# Patient Record
Sex: MALE | Race: WHITE | NOT HISPANIC OR LATINO
[De-identification: names, ages, dates, MRNs, and addresses within clinical notes are randomized per-mention and may not be internally consistent; named-entity substitution may affect disease eponyms.]

---

## 2019-08-29 ENCOUNTER — TRANSCRIPTION ENCOUNTER (OUTPATIENT)
Age: 54
End: 2019-08-29

## 2020-06-30 ENCOUNTER — INPATIENT (INPATIENT)
Facility: HOSPITAL | Age: 55
LOS: 0 days | Discharge: HOME | DRG: 392 | End: 2020-07-01
Attending: HOSPITALIST | Admitting: HOSPITALIST
Payer: COMMERCIAL

## 2020-06-30 VITALS
HEIGHT: 67 IN | DIASTOLIC BLOOD PRESSURE: 103 MMHG | SYSTOLIC BLOOD PRESSURE: 155 MMHG | WEIGHT: 225.09 LBS | OXYGEN SATURATION: 95 % | TEMPERATURE: 98 F | RESPIRATION RATE: 20 BRPM | HEART RATE: 88 BPM

## 2020-06-30 DIAGNOSIS — R07.9 CHEST PAIN, UNSPECIFIED: ICD-10-CM

## 2020-06-30 LAB
ALBUMIN SERPL ELPH-MCNC: 4.3 G/DL — SIGNIFICANT CHANGE UP (ref 3.3–5.2)
ALP SERPL-CCNC: 92 U/L — SIGNIFICANT CHANGE UP (ref 40–120)
ALT FLD-CCNC: 51 U/L — HIGH
ANION GAP SERPL CALC-SCNC: 15 MMOL/L — SIGNIFICANT CHANGE UP (ref 5–17)
AST SERPL-CCNC: 35 U/L — SIGNIFICANT CHANGE UP
BASOPHILS # BLD AUTO: 0.02 K/UL — SIGNIFICANT CHANGE UP (ref 0–0.2)
BASOPHILS NFR BLD AUTO: 0.2 % — SIGNIFICANT CHANGE UP (ref 0–2)
BILIRUB SERPL-MCNC: 1 MG/DL — SIGNIFICANT CHANGE UP (ref 0.4–2)
BUN SERPL-MCNC: 29 MG/DL — HIGH (ref 8–20)
CALCIUM SERPL-MCNC: 10.1 MG/DL — SIGNIFICANT CHANGE UP (ref 8.6–10.2)
CHLORIDE SERPL-SCNC: 101 MMOL/L — SIGNIFICANT CHANGE UP (ref 98–107)
CO2 SERPL-SCNC: 22 MMOL/L — SIGNIFICANT CHANGE UP (ref 22–29)
CREAT SERPL-MCNC: 1.01 MG/DL — SIGNIFICANT CHANGE UP (ref 0.5–1.3)
D DIMER BLD IA.RAPID-MCNC: <150 NG/ML DDU — SIGNIFICANT CHANGE UP
EOSINOPHIL # BLD AUTO: 0.03 K/UL — SIGNIFICANT CHANGE UP (ref 0–0.5)
EOSINOPHIL NFR BLD AUTO: 0.3 % — SIGNIFICANT CHANGE UP (ref 0–6)
GLUCOSE SERPL-MCNC: 159 MG/DL — HIGH (ref 70–99)
HCT VFR BLD CALC: 44.5 % — SIGNIFICANT CHANGE UP (ref 39–50)
HGB BLD-MCNC: 15.2 G/DL — SIGNIFICANT CHANGE UP (ref 13–17)
IMM GRANULOCYTES NFR BLD AUTO: 0.4 % — SIGNIFICANT CHANGE UP (ref 0–1.5)
LIDOCAIN IGE QN: 38 U/L — SIGNIFICANT CHANGE UP (ref 22–51)
LYMPHOCYTES # BLD AUTO: 1.06 K/UL — SIGNIFICANT CHANGE UP (ref 1–3.3)
LYMPHOCYTES # BLD AUTO: 10.3 % — LOW (ref 13–44)
MAGNESIUM SERPL-MCNC: 2 MG/DL — SIGNIFICANT CHANGE UP (ref 1.6–2.6)
MCHC RBC-ENTMCNC: 29.3 PG — SIGNIFICANT CHANGE UP (ref 27–34)
MCHC RBC-ENTMCNC: 34.2 GM/DL — SIGNIFICANT CHANGE UP (ref 32–36)
MCV RBC AUTO: 85.7 FL — SIGNIFICANT CHANGE UP (ref 80–100)
MONOCYTES # BLD AUTO: 0.36 K/UL — SIGNIFICANT CHANGE UP (ref 0–0.9)
MONOCYTES NFR BLD AUTO: 3.5 % — SIGNIFICANT CHANGE UP (ref 2–14)
NEUTROPHILS # BLD AUTO: 8.79 K/UL — HIGH (ref 1.8–7.4)
NEUTROPHILS NFR BLD AUTO: 85.3 % — HIGH (ref 43–77)
PLATELET # BLD AUTO: 224 K/UL — SIGNIFICANT CHANGE UP (ref 150–400)
POTASSIUM SERPL-MCNC: 4.6 MMOL/L — SIGNIFICANT CHANGE UP (ref 3.5–5.3)
POTASSIUM SERPL-SCNC: 4.6 MMOL/L — SIGNIFICANT CHANGE UP (ref 3.5–5.3)
PROT SERPL-MCNC: 6.6 G/DL — SIGNIFICANT CHANGE UP (ref 6.6–8.7)
RBC # BLD: 5.19 M/UL — SIGNIFICANT CHANGE UP (ref 4.2–5.8)
RBC # FLD: 13.3 % — SIGNIFICANT CHANGE UP (ref 10.3–14.5)
SARS-COV-2 RNA SPEC QL NAA+PROBE: SIGNIFICANT CHANGE UP
SODIUM SERPL-SCNC: 138 MMOL/L — SIGNIFICANT CHANGE UP (ref 135–145)
TROPONIN T SERPL-MCNC: <0.01 NG/ML — SIGNIFICANT CHANGE UP (ref 0–0.06)
WBC # BLD: 10.3 K/UL — SIGNIFICANT CHANGE UP (ref 3.8–10.5)
WBC # FLD AUTO: 10.3 K/UL — SIGNIFICANT CHANGE UP (ref 3.8–10.5)

## 2020-06-30 PROCEDURE — 71045 X-RAY EXAM CHEST 1 VIEW: CPT | Mod: 26

## 2020-06-30 PROCEDURE — 71275 CT ANGIOGRAPHY CHEST: CPT | Mod: 26

## 2020-06-30 PROCEDURE — 74174 CTA ABD&PLVS W/CONTRAST: CPT | Mod: 26

## 2020-06-30 PROCEDURE — 93010 ELECTROCARDIOGRAM REPORT: CPT | Mod: 76

## 2020-06-30 PROCEDURE — 75571 CT HRT W/O DYE W/CA TEST: CPT | Mod: 26

## 2020-06-30 PROCEDURE — 99222 1ST HOSP IP/OBS MODERATE 55: CPT

## 2020-06-30 PROCEDURE — 99220: CPT

## 2020-06-30 RX ORDER — MORPHINE SULFATE 50 MG/1
2 CAPSULE, EXTENDED RELEASE ORAL
Refills: 0 | Status: DISCONTINUED | OUTPATIENT
Start: 2020-06-30 | End: 2020-07-01

## 2020-06-30 RX ORDER — FAMOTIDINE 10 MG/ML
20 INJECTION INTRAVENOUS ONCE
Refills: 0 | Status: COMPLETED | OUTPATIENT
Start: 2020-06-30 | End: 2020-06-30

## 2020-06-30 RX ORDER — LANOLIN ALCOHOL/MO/W.PET/CERES
3 CREAM (GRAM) TOPICAL AT BEDTIME
Refills: 0 | Status: DISCONTINUED | OUTPATIENT
Start: 2020-06-30 | End: 2020-07-01

## 2020-06-30 RX ORDER — ASPIRIN/CALCIUM CARB/MAGNESIUM 324 MG
324 TABLET ORAL ONCE
Refills: 0 | Status: COMPLETED | OUTPATIENT
Start: 2020-06-30 | End: 2020-06-30

## 2020-06-30 RX ORDER — LIDOCAINE 4 G/100G
10 CREAM TOPICAL ONCE
Refills: 0 | Status: COMPLETED | OUTPATIENT
Start: 2020-06-30 | End: 2020-06-30

## 2020-06-30 RX ORDER — ASPIRIN/CALCIUM CARB/MAGNESIUM 324 MG
81 TABLET ORAL DAILY
Refills: 0 | Status: DISCONTINUED | OUTPATIENT
Start: 2020-06-30 | End: 2020-07-01

## 2020-06-30 RX ORDER — ATORVASTATIN CALCIUM 80 MG/1
20 TABLET, FILM COATED ORAL AT BEDTIME
Refills: 0 | Status: DISCONTINUED | OUTPATIENT
Start: 2020-06-30 | End: 2020-07-01

## 2020-06-30 RX ORDER — AMLODIPINE BESYLATE 2.5 MG/1
10 TABLET ORAL DAILY
Refills: 0 | Status: DISCONTINUED | OUTPATIENT
Start: 2020-06-30 | End: 2020-07-01

## 2020-06-30 RX ORDER — ACETAMINOPHEN 500 MG
650 TABLET ORAL EVERY 6 HOURS
Refills: 0 | Status: DISCONTINUED | OUTPATIENT
Start: 2020-06-30 | End: 2020-07-01

## 2020-06-30 RX ORDER — LOSARTAN POTASSIUM 100 MG/1
100 TABLET, FILM COATED ORAL DAILY
Refills: 0 | Status: DISCONTINUED | OUTPATIENT
Start: 2020-06-30 | End: 2020-07-01

## 2020-06-30 RX ORDER — METOPROLOL TARTRATE 50 MG
100 TABLET ORAL ONCE
Refills: 0 | Status: COMPLETED | OUTPATIENT
Start: 2020-06-30 | End: 2020-06-30

## 2020-06-30 RX ORDER — LABETALOL HCL 100 MG
10 TABLET ORAL ONCE
Refills: 0 | Status: COMPLETED | OUTPATIENT
Start: 2020-06-30 | End: 2020-06-30

## 2020-06-30 RX ORDER — PANTOPRAZOLE SODIUM 20 MG/1
40 TABLET, DELAYED RELEASE ORAL
Refills: 0 | Status: DISCONTINUED | OUTPATIENT
Start: 2020-06-30 | End: 2020-07-01

## 2020-06-30 RX ORDER — HYDRALAZINE HCL 50 MG
10 TABLET ORAL ONCE
Refills: 0 | Status: DISCONTINUED | OUTPATIENT
Start: 2020-06-30 | End: 2020-06-30

## 2020-06-30 RX ORDER — SODIUM CHLORIDE 9 MG/ML
1000 INJECTION INTRAMUSCULAR; INTRAVENOUS; SUBCUTANEOUS ONCE
Refills: 0 | Status: COMPLETED | OUTPATIENT
Start: 2020-06-30 | End: 2020-06-30

## 2020-06-30 RX ADMIN — ATORVASTATIN CALCIUM 20 MILLIGRAM(S): 80 TABLET, FILM COATED ORAL at 20:55

## 2020-06-30 RX ADMIN — Medication 10 MILLIGRAM(S): at 09:05

## 2020-06-30 RX ADMIN — Medication 30 MILLILITER(S): at 09:05

## 2020-06-30 RX ADMIN — Medication 100 MILLIGRAM(S): at 12:09

## 2020-06-30 RX ADMIN — FAMOTIDINE 20 MILLIGRAM(S): 10 INJECTION INTRAVENOUS at 09:05

## 2020-06-30 RX ADMIN — MORPHINE SULFATE 2 MILLIGRAM(S): 50 CAPSULE, EXTENDED RELEASE ORAL at 16:39

## 2020-06-30 RX ADMIN — LIDOCAINE 10 MILLILITER(S): 4 CREAM TOPICAL at 09:05

## 2020-06-30 RX ADMIN — SODIUM CHLORIDE 1000 MILLILITER(S): 9 INJECTION INTRAMUSCULAR; INTRAVENOUS; SUBCUTANEOUS at 10:43

## 2020-06-30 RX ADMIN — AMLODIPINE BESYLATE 10 MILLIGRAM(S): 2.5 TABLET ORAL at 16:39

## 2020-06-30 RX ADMIN — Medication 30 MILLILITER(S): at 22:10

## 2020-06-30 RX ADMIN — Medication 3 MILLIGRAM(S): at 20:55

## 2020-06-30 RX ADMIN — Medication 324 MILLIGRAM(S): at 09:08

## 2020-06-30 NOTE — ED PROVIDER NOTE - ATTENDING CONTRIBUTION TO CARE
HPI: 55yo male with h/o HTN, hyperlipidemia presenting with gnawing, severe midsternal chest pain, nonradiating, a/w diaphoresis. No shortness of breath, no back pain, no nausea/vomiting. +H/o gastroesophageal reflux disease but states this doesn't feel like gastroesophageal reflux disease. No fevers/chills. Follows with [facility], saw them a few years ago and reportedly had negative stress test at that time.     PE:  Gen: NAD  Head: NCAT  HEENT: Oral mucosa moist, normal conjunctiva  CV: RRR no murmurs, normal perfusion, pulses 2+ throughout  Resp: CTA b/l, no wheezing, rales, rhonchi, no respiratory distress  GI: Abd Soft NTND  Neuro: No focal neuro deficits  MSK: FROM all 4 extremities, no deformity  Skin: No rash, no bruising  Psych: Normal affect    MDM: 55yo male with h/o HTN, hyperlipidemia presenting with gnawing severe midsternal chest pain- ekg, labs, cta c/a/p r/o dissection, likely obs serial trop and cards c/s. Jackie Mendez DO         I performed a history and physical exam of the patient and discussed their management with the resident. I reviewed the resident's note and agree with the documented findings and plan of care. My medical decision making and observations are found above.

## 2020-06-30 NOTE — H&P ADULT - NSHPPHYSICALEXAM_GEN_ALL_CORE
T(C): 36.6 (06-30-20 @ 12:29), Max: 36.6 (06-30-20 @ 08:05)  HR: 62 (06-30-20 @ 12:44) (62 - 88)  BP: 132/86 (06-30-20 @ 12:29) (132/86 - 158/91)  RR: 19 (06-30-20 @ 12:29) (19 - 20)  SpO2: 97% (06-30-20 @ 12:29) (95% - 97%)    PHYSICAL EXAM: evaluation precludes physical exam. Pertinent physical exam findings as per video conference with teleNA  at bedside :

## 2020-06-30 NOTE — ED PROVIDER NOTE - NS ED ROS FT
No
CONSTITUTIONAL: No fevers, no chills  Eyes: No vision changes  Cardiovascular: +chest pain  Respiratory: No SOB  Gastrointestinal: No n/v/c/d, no abd pain  Genitourinary: no dysuria, no hematuria  SKIN: no rashes.  MSK: no weakness, no myalgias, no arthralgias  NEURO: no headache, no weakness, no numbness  PSYCHIATRIC: no known mental health issues.

## 2020-06-30 NOTE — ED ADULT NURSE REASSESSMENT NOTE - NS ED NURSE REASSESS COMMENT FT1
HR now 62 on monitor, main CT called and informed.
MD Bowman (cardiologist) at pts bedside, plan of care explained to pt, pt verbalized understaffing.
assumed care of pt at this time. pt a+ox3, lying comfortably in bed. pt reports midsternal chest pain, states pain has subsided since arriving but still present. pt denies any SOB or diff breathing. pt pre-medicated with Lopressor for CTA. pt placed on monitor, sinus with PVC's @ 85. VSS, pt offers no complaints at this time. will continue to monitor.
Pt awake and alert, no respiratory distress, VSS afebrile. Pt on cardiac monitor, sinus rhythm 60s. No c/o chest pain or discomfort at this time. Safety maintained. Will continue to monitor.

## 2020-06-30 NOTE — ED ADULT NURSE NOTE - OBJECTIVE STATEMENT
Assumed pt care at 0823.  Pt a&o x 3.  Pt c/o knawing steady midsternal deep chest pain that started this morning at work and is now a 6/10.  Pt states this is the first time he has ever had this.  Pt has no PMH of hearty disease, denies SOB, belly pain, N/V, radiation.  Pt states that the pain is slightly relieved when sitting down.  Pt drove himself to ED and states he was diaphoretic on the way here.  He took BP and cholesterol meds this morning. NAD noted.  Pt on cardiac monitor in bed.  Safety precautions maintained. Assumed pt care at 0823.  Pt a&o x 3.  Pt c/o gnawing steady midsternal deep chest pain that started this morning at work and is now a 6/10.  Pt states this is the first time he has ever had this.  Pt has no PMH of hearty disease, denies SOB, belly pain, N/V, radiation.  Pt states that the pain is slightly relieved when sitting down.  Pt drove himself to ED and states he was diaphoretic on the way here.  He took BP and cholesterol meds this morning. NAD noted.  Pt on cardiac monitor in bed.  Safety precautions maintained.

## 2020-06-30 NOTE — H&P ADULT - HISTORY OF PRESENT ILLNESS
54 year old male w gout, HLD, HTN who presented to ED c/o chest pain      Pt states that he was walking around at work this morning and began to have 8/10 SSCP w/o radiation.  He describes the pain as gnawing in nature, constant, nothing has makes it worse, but he states that it has lessened to 6/10 currently.  He has not had this type of pain before.  He drove himself to the ED and states that he began to feel sweaty on the drive over, but that has resolved.  He recently returned for vacation.  Endorses EtOH, denies smoking, illicit drug use.  Denies fever, shortness of breath, abdominal pain, back pain, numbness/tingling, nausea, vomiting, constipation, diarrhea.    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  He describes the pain as gnawing in nature, constant, nothing has makes it worse, but he states that it has lessened to 6/10 currently.  He has not had this type of pain before.  He drove himself to the ED and states that he began to feel sweaty on the drive over, but that has resolved.  He recently returned for vacation.  Endorses EtOH, denies smoking, illicit drug use.  Denies fever, shortness of breath, abdominal pain, back pain, numbness/tingling, nausea, vomiting, constipation, diarrhea.    Last had stress test 2-3 years ago  Denied recent change in exercise tolerance until this AM    In ED /103  HR 88  RR 20  T 97.9  95% sat RA  In ED received  mg  IV labetalol 10 mg , IV hydralazine 10 mg  CT w IV contrast chest ,abd and pelvis ruled out aortic dissection and PE  prior to cardiac CT po lopressor 100 mg x 1 po  cardiology was consulted        PAST MEDICAL HX  Gout  HLD hyperlipidemia  HTN hypertension      PAST SURGICAL HX  Colonoscopy 2018  Knee surgery Left:  ACL + MCL repair    FAMILY HX  +breast CA: mother  +HTN: mother  denied CAD      SOCIAL HX  nonsmoker  social alcohol  works as a  making deliveries

## 2020-06-30 NOTE — ED PROVIDER NOTE - PROGRESS NOTE DETAILS
Given the significant and immediate threats to this patient based on initial presentation, the benefits of emergency contrast-enhanced CT imaging without obtaining GFR/creatinine serum level results greatly outweigh the potential risk of harm due to contrast-induced nephropathy. Chriss: Discussed with radiology attending wrt contrast for CCTA after contrast for CTA Chest/Abd/Pelv, no contraindication to proceed with CCTA.  Pt given IVF bolus, and no hx of renal disease.

## 2020-06-30 NOTE — ED CDU PROVIDER INITIAL DAY NOTE - OBJECTIVE STATEMENT
54M h/o HTn, HLD p/w CP while walking this morning. Constant, no aggravating factors. Pain improving by the time he arrived in ED. Denies fever, SOB, abd pain, nausea, numbness, tingling.

## 2020-06-30 NOTE — CONSULT NOTE ADULT - ASSESSMENT
54y Male with prior history of HTN, HLD, obesity who presents with chest pain.     HTN  - BP elevated received labetolol    Dyslipidemia  - Continue statin    Chest pain  - d/w ED regarding CTCA to r/o CAD  - Serial cardiac enzymes for now    Plan discussed with ER team

## 2020-06-30 NOTE — ED CDU PROVIDER DISPOSITION NOTE - ATTENDING CONTRIBUTION TO CARE
Heidi: I performed a face to face bedside interview with patient regarding history of present illness, review of symptoms and past medical history. I completed an independent physical exam.  I have discussed patient's plan of care with advanced care provider.   I agree with note as stated above including HISTORY OF PRESENT ILLNESS, HIV, PAST MEDICAL/SURGICAL/FAMILY/SOCIAL HISTORY, ALLERGIES AND HOME MEDICATIONS, REVIEW OF SYSTEMS, PHYSICAL EXAM, MEDICAL DECISION MAKING and any PROGRESS NOTES during the time I functioned as the attending physician for this patient  unless otherwise noted. My brief assessment is as follows: Patient placed in CDU for cardiac cta, unable to safely control HR for CTA. Discussed with cardiology, will admit for cath.

## 2020-06-30 NOTE — CONSULT NOTE ADULT - SUBJECTIVE AND OBJECTIVE BOX
[facility], THE [facility]                              [address] [address]                                                 PHONE: [phone]                                                 FAX: [phone]  ------------------------------------------------------------------------------------------------    54y Male with past medical history as under presents c/o chest pain.  Pt states that he was walking around at work this morning and began to have 8/10 SSCP w/o radiation.  He describes the pain as gnawing in nature, constant, nothing has makes it worse, but he states that it has lessened to 6/10 currently.  He has not had this type of pain before.  He drove himself to the ED and states that he began to feel sweaty on the drive over, but that has resolved.   He reports he had been in the [facility] on vacation and has not been watching his diet and salt intake. He denies any prior cardiac history and reportedly had stress test few years ago.   At the time of evaluation, pt reports 3/10 chest pain. He has h/o HTN and HLD. He in on Exforge and Crestor.   He was noted to be markedly hypertensive and received iv labetalol with control of his BP.    PAST MEDICAL & SURGICAL HISTORY:      No Known Allergies      Review of Systems:  Positive for chest pain  Rest of the systems were reviewed and was negative.     Family history:   No pertinent family history in first degree relative of early CAD, sudden cardiac death or  congenital heart disease    Social History:  No smoking   No alcohol  No other drug use    Vital Signs Last 24 Hrs  T(C): 36.6 (30 Jun 2020 08:05), Max: 36.6 (30 Jun 2020 08:05)  T(F): 97.9 (30 Jun 2020 08:05), Max: 97.9 (30 Jun 2020 08:05)  HR: 80 (30 Jun 2020 10:09) (80 - 88)  BP: 148/70 (30 Jun 2020 10:09) (148/70 - 158/91)  BP(mean): --  RR: 20 (30 Jun 2020 10:09) (20 - 20)  SpO2: 97% (30 Jun 2020 10:09) (95% - 97%)    PHYSICAL EXAM:  Constitutional: Obese male laying in bed; alert and co-operative  HEENT:     Head: Normocephalic and atraumatic  Eyes: Conjunctiva normal, No scleral icterus  Neck: Supple, No JVD  Mouth/Throat: Mucous membranes are normal. Mucosa are not pale or dry.  Cardiovascular: regular S1, S2  Respiratory: Lungs clear to auscultation; no crepitations, no wheeze  Gastrointestinal:  Soft, Non-tender, + BS	  Musculoskeletal: Normal range of motion. No edema  Skin: Warm and dry. No cyanosis . No diaphoresis.  Neurologic: Alert oriented to time place and person. Normal strength and no tremor.  Psychiatric: Normal mood and affect, Speech is normal and behavior is normal.        LABS:                        15.2   10.30 )-----------( 224      ( 30 Jun 2020 09:08 )             44.5     06-30    138  |  101  |  29.0<H>  ----------------------------<  159<H>  4.6   |  22.0  |  1.01    Ca    10.1      30 Jun 2020 09:08  Mg     2.0     06-30    TPro  6.6  /  Alb  4.3  /  TBili  1.0  /  DBili  x   /  AST  35  /  ALT  51<H>  /  AlkPhos  92  06-30    CARDIAC MARKERS ( 30 Jun 2020 09:08 )  x     / <0.01 ng/mL / x     / x     / x        RADIOLOGY & ADDITIONAL STUDIES: (reviewed)  CXR was independently reviewed and demonstrated: clear lungs    CARDIOLOGY TESTING:(reviewed)     ECG (Independent visualization): sinus bradycardia with no ST changes    MEDICATIONS:(reviewed)  Home Medications:  Home Medications:      MEDICATIONS  (STANDING):  sodium chloride 0.9% Bolus 1000 milliLiter(s) IV Bolus once    MEDICATIONS  (PRN):

## 2020-06-30 NOTE — ED CDU PROVIDER DISPOSITION NOTE - CLINICAL COURSE
54M h/o HTn, HLD p/w CP while walking this morning. Constant, no aggravating factors. Pain improving by the time he arrived in ED.  Troponin negative x 2, was unable to get CTA cardiac as HR was above 60 consistently.  He is still having CP 4/10, I s/w Dr Stout he had Dr Fuentes evaluate patient and he recommends admission for cardiac cath in am.

## 2020-06-30 NOTE — ED PROVIDER NOTE - OBJECTIVE STATEMENT
Pt is a 55 y/o M w/PMHx HTN, HLD presents c/o chest pain.  Pt states that he was walking around at work this morning and began to have 8/10 SSCP w/o radiation.  He describes the pain as gnawing in nature, constant, nothing has makes it worse, but he states that it has lessened to 6/10 currently.  He has not had this type of pain before.  He drove himself to the ED and states that he began to feel sweaty on the drive over, but that has resolved.  He recently returned for vacation.  Endorses EtOH, denies smoking, illicit drug use.  Denies fever, shortness of breath, abdominal pain, back pain, numbness/tingling, nausea, vomiting, constipation, diarrhea.

## 2020-06-30 NOTE — H&P ADULT - ASSESSMENT
54 year old male w gout, HLD, HTN who presented to ED c/o chest pain      #Acute chest pain/ ACS  CT scan rules out dissection and PE  unable to get Calcium score since elevated HR  1. admit to telemetry  2. serial trop  3.  mg in ED  4. asa 81 mg daily  5. NPO after midnight for cardiac cath      #Gout  1. on uloric; not available  2. if cardiac disease established on cath then discuss if this med should be continued      #HLD  1. check lipids in AM  2. crestor interchange to atorvastatn 20 mg      #HTN  on exforge   1. amlodipine 10 mg  2. valsartan 320 interchanged for losartan 100 mg      #Obesity/ increased BMI  1. Hb A1c  2. lipids as above      IMPROVE VTE Individual Risk Assessment    RISK                                                                Points    [  ] Previous VTE                                                  3    [  ] Thrombophilia                                               2    [  ] Lower limb paralysis                                      2        (unable to hold up >15 seconds)      [  ] Current Cancer                                              2         (within 6 months)    [  ] Immobilization > 24 hrs                                1    [  ] ICU/CCU stay > 24 hours                              1    [  ] Age > 60                                                      1    IMPROVE VTE Score ______0___    IMPROVE Score 0-1: Low Risk, No VTE prophylaxis required for most patients, encourage ambulation.   IMPROVE Score 2-3: At risk, pharmacologic VTE prophylaxis is indicated for most patients (in the absence of a contraindication)  IMPROVE Score > or = 4: High Risk, pharmacologic VTE prophylaxis is indicated for most patients (in the absence of a contraindication)

## 2020-06-30 NOTE — ED ADULT TRIAGE NOTE - CHIEF COMPLAINT QUOTE
Pt c/o midsternal chest pain x 1 hour, hx of HTN, pt states he became diaphoretic when pain started, denies n/v, denies radiation of pain, took BP meds this morning

## 2020-06-30 NOTE — PROGRESS NOTE ADULT - SUBJECTIVE AND OBJECTIVE BOX
[facility], [facility]                                   [address] [address]                                                      PHONE: [phone]                                                         FAX: [phone]  -----------------------------------------------------------------------------------------------------------    Troponin negative x 2, was unable to get CTA cardiac as HR was above 60 consistently.  He is still having CP 4/10. Planned for cardiac cath    Received call from pt's wife. Explained his clinical presentation and management plan including recommendations for coronary CTA and cath    She declines for pt to proceed with cardiac cath and would like the pt to be evaluated with Dr. Martin that she works with. She will have an appointment with him tomorrow    Will cancel cath for now    Pt can be discharged from cardiac standpoint and FU with Dr. Martin in AM [facility], [facility]                                   [address] [address]                                                      PHONE: [phone]                                                         FAX: [phone]  -----------------------------------------------------------------------------------------------------------    Troponin negative x 2, was unable to get CTA cardiac as HR was above 60 consistently.  He is still having CP 4/10. Planned for cardiac cath    Received call from pt's wife. Explained his clinical presentation and management plan including recommendations for coronary CTA and cath    She declines for pt to proceed with cardiac cath and would like the pt to be evaluated with Dr. Martin that she works with. She will have an appointment with him tomorrow    Will cancel cath for now    Pt can be discharged from cardiac standpoint and FU with Dr. Martin in AM        Addendum: 6/30/2020 4 pm    s/w wife . Pt and wife are agreeable to cath    will schedule in AM

## 2020-07-01 ENCOUNTER — TRANSCRIPTION ENCOUNTER (OUTPATIENT)
Age: 55
End: 2020-07-01

## 2020-07-01 VITALS
TEMPERATURE: 98 F | OXYGEN SATURATION: 97 % | HEART RATE: 73 BPM | SYSTOLIC BLOOD PRESSURE: 127 MMHG | DIASTOLIC BLOOD PRESSURE: 88 MMHG | RESPIRATION RATE: 18 BRPM

## 2020-07-01 LAB
A1C WITH ESTIMATED AVERAGE GLUCOSE RESULT: 6.1 % — HIGH (ref 4–5.6)
CHOLEST SERPL-MCNC: 238 MG/DL — HIGH (ref 110–199)
ESTIMATED AVERAGE GLUCOSE: 128 MG/DL — HIGH (ref 68–114)
HCV AB S/CO SERPL IA: 0.07 S/CO — SIGNIFICANT CHANGE UP (ref 0–0.99)
HCV AB SERPL-IMP: SIGNIFICANT CHANGE UP
HDLC SERPL-MCNC: 68 MG/DL — SIGNIFICANT CHANGE UP
LIPID PNL WITH DIRECT LDL SERPL: 109 MG/DL — SIGNIFICANT CHANGE UP
SARS-COV-2 IGG SERPL QL IA: POSITIVE
SARS-COV-2 IGM SERPL IA-ACNC: 18.7 INDEX — HIGH
TOTAL CHOLESTEROL/HDL RATIO MEASUREMENT: 4 RATIO — SIGNIFICANT CHANGE UP (ref 3.4–9.6)
TRIGL SERPL-MCNC: 306 MG/DL — HIGH (ref 10–200)

## 2020-07-01 PROCEDURE — 93005 ELECTROCARDIOGRAM TRACING: CPT

## 2020-07-01 PROCEDURE — 36415 COLL VENOUS BLD VENIPUNCTURE: CPT

## 2020-07-01 PROCEDURE — 71045 X-RAY EXAM CHEST 1 VIEW: CPT

## 2020-07-01 PROCEDURE — C1887: CPT

## 2020-07-01 PROCEDURE — 93458 L HRT ARTERY/VENTRICLE ANGIO: CPT

## 2020-07-01 PROCEDURE — 80053 COMPREHEN METABOLIC PANEL: CPT

## 2020-07-01 PROCEDURE — 99153 MOD SED SAME PHYS/QHP EA: CPT

## 2020-07-01 PROCEDURE — 86803 HEPATITIS C AB TEST: CPT

## 2020-07-01 PROCEDURE — 83690 ASSAY OF LIPASE: CPT

## 2020-07-01 PROCEDURE — G0378: CPT

## 2020-07-01 PROCEDURE — 96375 TX/PRO/DX INJ NEW DRUG ADDON: CPT | Mod: XU

## 2020-07-01 PROCEDURE — 93571 IV DOP VEL&/PRESS C FLO 1ST: CPT | Mod: LD

## 2020-07-01 PROCEDURE — 80061 LIPID PANEL: CPT

## 2020-07-01 PROCEDURE — 75571 CT HRT W/O DYE W/CA TEST: CPT

## 2020-07-01 PROCEDURE — 99238 HOSP IP/OBS DSCHRG MGMT 30/<: CPT

## 2020-07-01 PROCEDURE — 87635 SARS-COV-2 COVID-19 AMP PRB: CPT

## 2020-07-01 PROCEDURE — 74174 CTA ABD&PLVS W/CONTRAST: CPT

## 2020-07-01 PROCEDURE — 84484 ASSAY OF TROPONIN QUANT: CPT

## 2020-07-01 PROCEDURE — C1894: CPT

## 2020-07-01 PROCEDURE — 99285 EMERGENCY DEPT VISIT HI MDM: CPT | Mod: 25

## 2020-07-01 PROCEDURE — 83036 HEMOGLOBIN GLYCOSYLATED A1C: CPT

## 2020-07-01 PROCEDURE — 83735 ASSAY OF MAGNESIUM: CPT

## 2020-07-01 PROCEDURE — 96374 THER/PROPH/DIAG INJ IV PUSH: CPT | Mod: XU

## 2020-07-01 PROCEDURE — 85027 COMPLETE CBC AUTOMATED: CPT

## 2020-07-01 PROCEDURE — 99152 MOD SED SAME PHYS/QHP 5/>YRS: CPT

## 2020-07-01 PROCEDURE — 85379 FIBRIN DEGRADATION QUANT: CPT

## 2020-07-01 PROCEDURE — C1769: CPT

## 2020-07-01 PROCEDURE — C1760: CPT

## 2020-07-01 PROCEDURE — 86769 SARS-COV-2 COVID-19 ANTIBODY: CPT

## 2020-07-01 PROCEDURE — 71275 CT ANGIOGRAPHY CHEST: CPT

## 2020-07-01 RX ORDER — ESOMEPRAZOLE MAGNESIUM 40 MG/1
0 CAPSULE, DELAYED RELEASE ORAL
Qty: 0 | Refills: 0 | DISCHARGE

## 2020-07-01 RX ORDER — FEBUXOSTAT 40 MG/1
0 TABLET ORAL
Qty: 0 | Refills: 0 | DISCHARGE

## 2020-07-01 RX ORDER — PANTOPRAZOLE SODIUM 20 MG/1
1 TABLET, DELAYED RELEASE ORAL
Qty: 60 | Refills: 0
Start: 2020-07-01

## 2020-07-01 RX ORDER — AMLODIPINE AND VALSARTAN 5; 320 MG/1; MG/1
1 TABLET, FILM COATED ORAL
Qty: 0 | Refills: 0 | DISCHARGE

## 2020-07-01 RX ORDER — ASPIRIN/CALCIUM CARB/MAGNESIUM 324 MG
1 TABLET ORAL
Qty: 0 | Refills: 0 | DISCHARGE
Start: 2020-07-01

## 2020-07-01 RX ORDER — ROSUVASTATIN CALCIUM 5 MG/1
1 TABLET ORAL
Qty: 0 | Refills: 0 | DISCHARGE

## 2020-07-01 RX ORDER — ROSUVASTATIN CALCIUM 5 MG/1
1 TABLET ORAL
Qty: 30 | Refills: 0
Start: 2020-07-01 | End: 2020-07-30

## 2020-07-01 RX ORDER — FEBUXOSTAT 40 MG/1
1 TABLET ORAL
Qty: 0 | Refills: 0 | DISCHARGE

## 2020-07-01 RX ADMIN — Medication 81 MILLIGRAM(S): at 07:47

## 2020-07-01 RX ADMIN — MORPHINE SULFATE 2 MILLIGRAM(S): 50 CAPSULE, EXTENDED RELEASE ORAL at 07:47

## 2020-07-01 RX ADMIN — AMLODIPINE BESYLATE 10 MILLIGRAM(S): 2.5 TABLET ORAL at 05:39

## 2020-07-01 RX ADMIN — LOSARTAN POTASSIUM 100 MILLIGRAM(S): 100 TABLET, FILM COATED ORAL at 05:39

## 2020-07-01 RX ADMIN — PANTOPRAZOLE SODIUM 40 MILLIGRAM(S): 20 TABLET, DELAYED RELEASE ORAL at 05:39

## 2020-07-01 NOTE — DISCHARGE NOTE PROVIDER - CARE PROVIDER_API CALL
Perlman, Theodore M  GASTROENTEROLOGY  [address] [place] [address]  Phone: [phone]  Fax: [phone]  Follow Up Time:

## 2020-07-01 NOTE — PROGRESS NOTE ADULT - SUBJECTIVE AND OBJECTIVE BOX
Cardiology NP                                                                              PROGRESS NOTE      Post Cath   Normal Cors no intervention.     	  Vitals:  T(C): 36.7 (07-01-20 @ 08:40), Max: 36.7 (06-30-20 @ 20:38)  HR: 80 (07-01-20 @ 08:40) (62 - 85)  BP: 119/78 (07-01-20 @ 08:40) (119/78 - 158/97)  RR: 16 (07-01-20 @ 08:40) (16 - 19)  SpO2: 98% (07-01-20 @ 08:40) (97% - 98%)  Wt(kg): --  I&O's Summary          PHYSICAL EXAM:  Appearance: Comfortable. No acute distress  HEENT:  Head and neck: Atraumatic. Normocephalic.  Normal oral mucosa, PERRL, Neck is supple. No JVD, No carotid bruit.   Neurologic: A & O x 3, no focal deficits. EOMI.  Lymphatic: No cervical lymphadenopathy  Cardiovascular: Normal S1 S2, No murmur, rubs/gallops. No JVD, No edema  Respiratory: Lungs clear to auscultation  Gastrointestinal:  Soft, Non-tender, + BS  Lower Extremities: No edema  Psychiatry: Patient is calm. No agitation. Mood & affect appropriate  Skin: No rashes/ ecchymoses/cyanosis/ulcers visualized on the face, hands or feet.      CURRENT MEDICATIONS:  amLODIPine   Tablet 10 milliGRAM(s) Oral daily  losartan 100 milliGRAM(s) Oral daily    melatonin  pantoprazole    Tablet  atorvastatin  aspirin enteric coated          LABS:	 	  CARDIAC MARKERS ( 30 Jun 2020 20:37 )  x     / <0.01 ng/mL / x     / x     / x      p-BNP 30 Jun 2020 20:37: x    , CARDIAC MARKERS ( 30 Jun 2020 12:20 )  x     / <0.01 ng/mL / x     / x     / x      p-BNP 30 Jun 2020 12:20: x    , CARDIAC MARKERS ( 30 Jun 2020 09:08 )  x     / <0.01 ng/mL / x     / x     / x      p-BNP 30 Jun 2020 09:08: x                              15.2   10.30 )-----------( 224      ( 30 Jun 2020 09:08 )             44.5     06-30    138  |  101  |  29.0<H>  ----------------------------<  159<H>  4.6   |  22.0  |  1.01    Ca    10.1      30 Jun 2020 09:08  Mg     2.0     06-30    TPro  6.6  /  Alb  4.3  /  TBili  1.0  /  DBili  x   /  AST  35  /  ALT  51<H>  /  AlkPhos  92  06-30    proBNP:   Lipid Profile: Date: 07-01 @ 06:18  Total cholesterol 238; Direct LDL: 109; HDL: 68; Triglycerides:306    Plan/ Assessment  59 yo male post cath normal cta and angiogram nonobstructive    Post cath normal Cors cholesterol is elevated would increase Crestor to 10mg po daily would consider Pulmonary consult as out patient for sleep apnea.   Recommend diet modification as well as increase activity.   Continue tight control of BP and reduce salt intake.   BTB   bedrest for 1 hour and if stable ok to raise HOB >30 ' then if groin stable ok OOB

## 2020-07-01 NOTE — PROGRESS NOTE ADULT - SUBJECTIVE AND OBJECTIVE BOX
Pt seen prior to cath   labs reviewed   pain free   Mallampati 2   ASA 2   GFR 84  Creat 1.01  BRA % 0.7     awaiting cath with Dr Joseph.

## 2020-07-01 NOTE — DISCHARGE NOTE PROVIDER - HOSPITAL COURSE
54 male nonsmoker PMH HTN, Dyslipidemia, Gout recently binged (EtOH) on vacation for past 3-4 days presenting with sharp lower central chest pain lasting 2 hours currently 4/10, non radiating without any relieving or aggravating factors.     Negative workup including Troponin, EKG, CTA. Admitted for cardiac catheterization as per Cardiology recommendations.     Cardiac catheterization showed large RCA w/ mild disease, Large LAD w/ moderate proximal 50%-60% stenosis w/ large distal vessel/IFR    NEGATIVE. , A1c 6.1. His Crestor is being increased to 10mg.        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 Started in twice daily PPI. Advised to follow up with primary GI.        Medically stable for discharge home

## 2020-07-01 NOTE — DISCHARGE NOTE NURSING/CASE MANAGEMENT/SOCIAL WORK - PATIENT PORTAL LINK FT
You can access the FollowMyHealth Patient Portal offered by [facility] by registering at the following website: [email or web address]. By joining [facility]’s FollowMyHealth portal, you will also be able to view your health information using other applications (apps) compatible with our system.

## 2020-07-01 NOTE — DISCHARGE NOTE PROVIDER - NSDCMRMEDTOKEN_GEN_ALL_CORE_FT
aspirin 81 mg oral delayed release tablet: 1 tab(s) orally once a day  Exforge 10 mg-320 mg oral tablet: 1 tab(s) orally once a day  pantoprazole 40 mg oral delayed release tablet: 1 tab(s) orally 2 times a day  rosuvastatin 10 mg oral tablet: 1 tab(s) orally once a day  Uloric 40 mg oral tablet: 1 tab(s) orally once a day

## 2020-08-19 ENCOUNTER — TRANSCRIPTION ENCOUNTER (OUTPATIENT)
Age: 55
End: 2020-08-19

## 2021-03-19 PROBLEM — I10 ESSENTIAL (PRIMARY) HYPERTENSION: Chronic | Status: ACTIVE | Noted: 2020-06-30

## 2021-03-19 PROBLEM — E78.5 HYPERLIPIDEMIA, UNSPECIFIED: Chronic | Status: ACTIVE | Noted: 2020-06-30

## 2021-04-28 NOTE — ED ADULT TRIAGE NOTE - TEMPERATURE IN CELSIUS (DEGREES C)
Your Child's Health  7-8 Year-Old Visit      Adeola Shaw  April 28, 2021    Visit Vitals  BP 92/60   Pulse 76   Temp 98.2 °F (36.8 °C) (Temporal)   Ht 3' 10.06\" (1.17 m)   Wt 19.8 kg   SpO2 99%   BMI 14.46 kg/m²     Weight: 43.65 lbs      YOUR CHILD'S 7 and 8 YEAR-OLD VISITS      School / Development / Behavior   Children should be well-adjusted in their school setting this at age. Success in school depends on several skills--communication skills, cooperation, attention, cognitive ability. Problems in one area may affect a child’s overall school experience. It is very important to stay in touch with teachers in order to identify and address any problems as soon as they are recognized. To help your child learn well, be sure they are well rested and have a healthy breakfast every morning.    Children need to be in school if they are going to learn and advance. Missing school frequently will lead to a lot of problems for a child; this needs to be addressed if it is happening. If your child receives any extra services through an IEP, make sure the IEP is reviewed regularly and updated if needed.     With increasing age comes increasing opportunities for activities outside of school (sports, arts, scouting).  Being part of a peer group becomes more important to children as they are growing older. They may encounter friends with different values and beliefs. Discuss differences openly with your children to help them start to understand the diversity of our society. Always listen without interrupting. Your children may still need reminding of the rules and expectations which you have for them, but they are developing more of a conscience and awareness of right and wrong which will affect how they view rules and social expectations. Discuss what consequences are for not following family rules--make sure they are reasonable and be consistent in enforcing them.     Children should have some definite  responsibilities at this age. Simple household tasks like making their bed, setting the table, helping with meals or simple household chores should be an expectation. Tell your child that you notice and appreciate what they are doing so that they become more confident and ready to take on more responsibility as time goes by. Children are motivated to do well when their parents provide a lot of encouragement. Make sure to find time every day for just talking with your children (no TV, no phone, no music!). Be a good role model for them by always acting responsibly, keep promises, and being on time.     Ask your child if they feel safe at school. Bullying is common--it is difficult to know exactly how common it is, but most children probably experience some bullying during their school years. Bullying hurts everyone--the child who is bullied, children who witness it, and the person doing the bullying (those children are likely to develop long term behavior and self-esteem issues). Children should know to report to you and/or teachers if they are being teased or bullied or if they witness another child being bullied. Bullying in schools (or anywhere) should not be tolerated. Talk to teachers, administrators or guidance counselors at the school to help with this issue. Good online resources regarding bullying are [employer].gov and the American Academy of Pediatrics \"HealthyChildren.org\" website (search for \"Bullying\"). These websites include guidelines that may be useful to both parents and children.      Health and Safety in (and out of!) the Home  Smoking: Continue to protect your child from cigarette smoke; secondhand smoke increases their risk of heart and lung disease. Vapors from e-cigarettes may also be harmful, so do not use those in your home or around children. If you smoke and are ready to consider quitting, talk to your doctor. Nicotine replacement products can be very helpful in breaking this tough  addiction. 1-800-QUIT-NOW is a national help line that can help you find resources; other resources can be found at cdc.gov.    Safety on the Internet: Just as you would not allow your child to go anywhere they want outside, they should not be allowed to “wander” the internet on their own. Keep the computer where you can observe your child’s use. Make sure you know how to check the internet history and do it regularly. If possible, set up a safety filter which will prevent your child from accessing inappropriate sites.      Dental Health: Your child should be brushing at least twice daily for 2 minutes at a time with a pea-sized amount of regular (fluoridated) toothpaste. Make flossing a regular part of their dental routine at this age. Most children need a parent’s help to make sure all of their back teeth are brushed well until they are ~8 years old. Hopefully they have seen a dentist by this age; look for a one now if you do not already have one. Let our office know if you use water from a private well; testing for fluoride content is recommended to determine if fluoride supplements are needed. Limiting candy, other sweets, juice and sticky/chewy foods remains important for their dental (and overall!) health.    Healthy Eating: Eat meals together as a family and talk during meals. (Leave the television off and do not allow phones or other electronics at the table.) For in between meals, keep nutritious choices around for snack times (fresh fruits and vegetables, string cheese, whole-grain crackers, yogurt, hard-boiled eggs, nuts).School-age children need 3 servings of good sources of calcium daily; this can include lowfat (or skim) milk, yogurt, low fat cheese or foods which have been fortified with calcium.  They should also get 600 IU of vitamin D daily which (along with appropriate calcium intake) ensures good bone health. Most people cannot meet their vitamin D needs with their usual diet, so a multivitamin  with iron supplement is a good way to get it. (A pure vitamin D supplement with 400 or 600 IU is also okay.)  Protect your child from the problems of overweight and obesity by teaching them that healthy choices are important, as are continuing to avoid unhealthy choices like greasy fast food, bagged snacks, sodas, sweetened drinks, juice, candy and sweets. Don’t keep these types of food in your home because your child will find them! If you give your child juice, give them no more than 6 to 8 ounces of 100% fruit juice daily. (Remember that eating fruit is a lot healthier than drinking it!) Also, don't allow snacking in front of the TV set--that is an unhealthy habit that is easier to prevent than change!    Healthy Activity: Set a goal of 60 minutes of physical activity every day--it can be all at once or broken up into shorter segments. Try to choose family activities as much as possible.  Do not overschedule your children. They may be tempted by lots of activities when their friends are participating in them, but they still need plenty of time for schoolwork, family time and some simple unstructured downtime.  Time sitting watching television, playing on the computer or using any form of electronic media is NOT physical activity. Set a time limit for media use each day (and enforce it!).  For suggestions on developing healthy media habits, go to the American Academy of Pediatrics \"HealthyChildren.org\" website and search for \"media use plan\".     Healthy Sleep: Children this age need 10 to 11 hours of sleep each night. Have a regular bedtime routine that does not involve electronic media (including TV) because screen time before bedtime is known to cause sleep problems. Develop a quiet routine that involves reading together or reading in bed for a short time before sleep.    Children this age should not have access to their electronic devices in their bedroom at night. All electronic media use should be  supervised.     Safety on the Road: Once your child weighs more than 40 pounds, they can start riding in a high-backed booster seat. They should ride properly secured in a booster seat in the back seat until they are 4 feet 9 inches tall. High-backed booster seats should be used if there are low seat backs or no head rests in your car; backless boosters can be used if your car has high seat backs and head rests.     Children (and their parents!) should wear properly fitted helmets when biking. Watch your children biking to determine how good their judgement is and set limits about where and when they can be biking based on what you see.     Safety in the Water: This is a good age for swimming lessons if your child is not yet a good swimmer. Even if they are comfortable swimming, they should always be supervised when swimming. They should always wear [employer] approved life jackets when on any sort of boat or watercraft. If you have access to a swimming pool where you live (in an apartment complex, neighborhood or your own yard), be sure it is fenced with a locked, self-closing, self-latching gate which prevents unsupervised access by children. Remember to use sunscreen with an SPF of 15 or higher when outside and reapply after time in the water.  Your child should avoid prolonged time in the sun between 11 AM and 3 PM and wear hats, sunglasses and sun protection clothing.    Personal Safety: A parent’s safety is just as important as a child’s safety. Violence is common in many people’s lives. If you do not feel safe in your home or if a partner has ever hit, kicked, shoved or physically hurt you or your child, it is important for you to get help. Talk to your doctors or a . In [place], resources include [facility] (952-944-2367) and the [facility] (24 hour hotline is 048- 479-4167); the National Domestic Violence Hotline is 2-869-773-YBBH (9704).    Review with your  child that certain body parts (the parts usually covered by a bathing suit) and behaviors are private. For safety purposes, make sure your child knows that they should never keep secrets from parents, and they should always report to you if any adult or older child shows any interest in their private parts (or if an older person discussed or shared their own private parts with a child). Tell them they should talk to you if any adult is doing or saying anything that makes them uncomfortable, especially if an adult is asking them to keep a secret.    Remind your child about he the importance of never opening the door to anyone they don’t know. Make sure your child always knows how to reach you and what to do in the case of a fire or other emergency. Teach your child about using “911”.     Guns and Firearms: Firearms in homes can pose a safety risk; if you need to keep a gun, be sure it is stored safely: locked, unloaded, with ammunition stored separately. Even the best-behaved children are curious--so make sure firearms are stored safely in your home and anywhere they visit. They are too young to be taught to safely handle a weapon. Teach them that if they ever see a gun, they should not touch it, they should leave the immediate area and they should tell an adult.    MEDICATION FOR FEVER OR PAIN:   Acetaminophen liquid (e.g., Tylenol or Tempra) may be given every four hours as needed for pain or fever.  Acetaminophen liquid is less concentrated than the infant dropper bottle type.  Be sure to check which product CONCENTRATION you are using.    CHILDREN’S Tylenol/Acetaminophen  (160 MG/5 mL)    Child’s Weight:  Dose:  36 - 47 pounds:    240 mg (7.5 mL (1 1/2 Teaspoons))  48 - 59 pounds:    320 mg (10.0 mL (2 Teaspoons))  60 - 71 pounds:    400 mg (12.5 mL (2 1/2 Teaspoons))  Greater than 72 pounds:   480 mg (15.0 mL (3 Teaspoons))    CHILDREN’S Tylenol/Acetaminophen MELTAWAYS ( 80 MG tablets)    Child’s  Weight:  Dose:  36 - 47 pounds:    240 mg (3 meltaway tablets)  48 - 59 pounds:    320 mg (4 meltaway tablets)  60 - 71 pounds:    400 mg (5 meltaway tablets)  Greater than 72 pounds:   480 mg (6 meltaway tablets)     (Jr) Tylenol/Acetaminophen MELTAWAYS (160 MG tablets)    Child’s Weight:  Dose:  36 - 47 pounds:    240 mg (1 1/2 meltaway tablets)  48 - 59 pounds:    320 mg (2 meltaway tablets)  60 - 71 pounds:    400 mg (2 1/2 meltaway tablets)  Greater than 72 pounds:   480 mg (3 meltaway tablets)    CHILDREN'S Ibuprofen liquid (e.g., Advil or Motrin) may be given every six hours as needed for pain or fever.    Child’s Weight:  Dose:  36 - 47 pounds:    150 mg (1 1/2 Teaspoons)  48 - 59 pounds:    200 mg (2 Teaspoons)  60 - 71 pounds:    250 mg (2 1/2 Teaspoons)  Greater than 72 pounds:   300 mg (3 Teaspoons)    NEXT VISIT:  IN 1 YEAR      Thank you for entrusting your care to [facility].    Also, check out “Children’s Health” on the [facility] Blog for updates on timely topics regarding children’s health!   36.6

## 2021-05-17 ENCOUNTER — APPOINTMENT (OUTPATIENT)
Dept: RHEUMATOLOGY | Facility: CLINIC | Age: 56
End: 2021-05-17

## 2022-05-24 ENCOUNTER — APPOINTMENT (OUTPATIENT)
Dept: MAMMOGRAPHY | Facility: CLINIC | Age: 57
End: 2022-05-24
Payer: COMMERCIAL

## 2022-05-24 ENCOUNTER — APPOINTMENT (OUTPATIENT)
Dept: ULTRASOUND IMAGING | Facility: CLINIC | Age: 57
End: 2022-05-24
Payer: COMMERCIAL

## 2022-05-24 ENCOUNTER — OUTPATIENT (OUTPATIENT)
Dept: OUTPATIENT SERVICES | Facility: HOSPITAL | Age: 57
LOS: 1 days | End: 2022-05-24
Payer: COMMERCIAL

## 2022-05-24 DIAGNOSIS — R92.8 OTHER ABNORMAL AND INCONCLUSIVE FINDINGS ON DIAGNOSTIC IMAGING OF BREAST: ICD-10-CM

## 2022-05-24 PROCEDURE — 76882 US LMTD JT/FCL EVL NVASC XTR: CPT | Mod: 26,RT

## 2022-05-24 PROCEDURE — G0279: CPT | Mod: 26

## 2022-05-24 PROCEDURE — 76641 ULTRASOUND BREAST COMPLETE: CPT

## 2022-05-24 PROCEDURE — 77066 DX MAMMO INCL CAD BI: CPT | Mod: 26

## 2022-05-24 PROCEDURE — 77066 DX MAMMO INCL CAD BI: CPT

## 2022-05-24 PROCEDURE — G0279: CPT

## 2022-05-24 PROCEDURE — 76641 ULTRASOUND BREAST COMPLETE: CPT | Mod: 26,50

## 2022-05-24 PROCEDURE — 76882 US LMTD JT/FCL EVL NVASC XTR: CPT

## 2022-07-20 ENCOUNTER — APPOINTMENT (OUTPATIENT)
Dept: RHEUMATOLOGY | Facility: CLINIC | Age: 57
End: 2022-07-20

## 2022-07-20 ENCOUNTER — RESULT REVIEW (OUTPATIENT)
Age: 57
End: 2022-07-20

## 2022-07-20 VITALS
TEMPERATURE: 98.2 F | OXYGEN SATURATION: 98 % | HEIGHT: 67 IN | WEIGHT: 240 LBS | DIASTOLIC BLOOD PRESSURE: 96 MMHG | SYSTOLIC BLOOD PRESSURE: 150 MMHG | HEART RATE: 82 BPM | BODY MASS INDEX: 37.67 KG/M2 | RESPIRATION RATE: 17 BRPM

## 2022-07-20 DIAGNOSIS — Z00.00 ENCOUNTER FOR GENERAL ADULT MEDICAL EXAMINATION W/OUT ABNORMAL FINDINGS: ICD-10-CM

## 2022-07-20 DIAGNOSIS — Z78.9 OTHER SPECIFIED HEALTH STATUS: ICD-10-CM

## 2022-07-20 DIAGNOSIS — Z86.39 PERSONAL HISTORY OF OTHER ENDOCRINE, NUTRITIONAL AND METABOLIC DISEASE: ICD-10-CM

## 2022-07-20 DIAGNOSIS — Z82.49 FAMILY HISTORY OF ISCHEMIC HEART DISEASE AND OTHER DISEASES OF THE CIRCULATORY SYSTEM: ICD-10-CM

## 2022-07-20 PROCEDURE — 99205 OFFICE O/P NEW HI 60 MIN: CPT

## 2022-07-20 RX ORDER — CARVEDILOL 12.5 MG/1
12.5 TABLET, FILM COATED ORAL
Qty: 180 | Refills: 0 | Status: ACTIVE | COMMUNITY
Start: 2021-10-27

## 2022-07-20 RX ORDER — PANTOPRAZOLE 40 MG/1
40 TABLET, DELAYED RELEASE ORAL
Qty: 77 | Refills: 0 | Status: ACTIVE | COMMUNITY
Start: 2022-07-05

## 2022-07-20 RX ORDER — ROSUVASTATIN CALCIUM 10 MG/1
10 TABLET, FILM COATED ORAL
Qty: 77 | Refills: 0 | Status: ACTIVE | COMMUNITY
Start: 2021-10-20

## 2022-07-20 RX ORDER — AMLODIPINE AND VALSARTAN 10; 320 MG/1; MG/1
10-320 TABLET, FILM COATED ORAL
Qty: 60 | Refills: 0 | Status: ACTIVE | COMMUNITY
Start: 2022-02-24

## 2022-07-20 NOTE — ASSESSMENT
[FreeTextEntry1] : Gout\par Now presenting with increased polyarthralgias, myalgia, fatigue\par \par - repeat labs\par - imaging as below\par - c/w allopurinol 300mg daily\par - c/w prednisone prn for acute flares\par - low purine diet encouraged\par \par Discussed treatment plan with the patient. The patient was given the opportunity to ask questions and all questions were answered to their satisfaction.\par

## 2022-07-20 NOTE — PHYSICAL EXAM
[General Appearance - Alert] : alert [General Appearance - In No Acute Distress] : in no acute distress [General Appearance - Well Nourished] : well nourished [General Appearance - Well Developed] : well developed [Sclera] : the sclera and conjunctiva were normal [Outer Ear] : the ears and nose were normal in appearance [Neck Appearance] : the appearance of the neck was normal [Heart Sounds] : normal S1 and S2 [Musculoskeletal - Swelling] : no joint swelling seen [] : no rash [No Focal Deficits] : no focal deficits [Oriented To Time, Place, And Person] : oriented to person, place, and time [FreeTextEntry1] : +BL knee crepitus

## 2022-07-20 NOTE — HISTORY OF PRESENT ILLNESS
[FreeTextEntry1] : 56 year old male with PMH as listed below presents today for an initial evaluation/ establish care\par \par Previously following rheumatology Dr Couch. \par Has hx of intermittent monoarthritis, BL olecranon bursitis, tophi, elevated UA. Dx with gout and started on allopurinol. Currently on allopurinol 300mg daily. Tolerating medication well. Denies side effects. \par Has continued gout attacks- last attack 2 weeks ago. Not very compliant with diet. \par Typical attacks to LE last 2-3 days. He takes prednisone 10mg during attacks. \par \par Was previously on Uloric- stopped due to side effect profile\par Was previously on Colchicine \par \par Today presenting with overall increase in polyarthralgias, myalgia, fatigue. \par Reports to have pain to low back, BL knee, BL ankle, BL feet. \par Current pain does not feel like his usual gout attacks. \par His symptoms start in AM and lasts the whole day. Worse at the end of the day. \par \par denies fever, chills, oral ulcers, denies chest pain, chest palpitations, denies sob, denies nausea, vomiting, abdominal pain, blood in the urine, denies blood clots. No rashes\par \par occupation:

## 2022-08-01 PROBLEM — Z00.00 ENCOUNTER FOR PREVENTIVE HEALTH EXAMINATION: Noted: 2022-08-01

## 2022-08-22 ENCOUNTER — APPOINTMENT (OUTPATIENT)
Dept: RADIOLOGY | Facility: CLINIC | Age: 57
End: 2022-08-22

## 2022-08-22 ENCOUNTER — OUTPATIENT (OUTPATIENT)
Dept: OUTPATIENT SERVICES | Facility: HOSPITAL | Age: 57
LOS: 1 days | End: 2022-08-22
Payer: COMMERCIAL

## 2022-08-22 DIAGNOSIS — M25.50 PAIN IN UNSPECIFIED JOINT: ICD-10-CM

## 2022-08-22 PROCEDURE — 72100 X-RAY EXAM L-S SPINE 2/3 VWS: CPT | Mod: 26

## 2022-08-22 PROCEDURE — 72100 X-RAY EXAM L-S SPINE 2/3 VWS: CPT

## 2022-08-22 PROCEDURE — 73610 X-RAY EXAM OF ANKLE: CPT | Mod: 26,50

## 2022-08-22 PROCEDURE — 73562 X-RAY EXAM OF KNEE 3: CPT | Mod: 26,50

## 2022-08-22 PROCEDURE — 73610 X-RAY EXAM OF ANKLE: CPT

## 2022-08-22 PROCEDURE — 73562 X-RAY EXAM OF KNEE 3: CPT

## 2022-08-26 ENCOUNTER — APPOINTMENT (OUTPATIENT)
Dept: DERMATOLOGY | Facility: CLINIC | Age: 57
End: 2022-08-26

## 2022-09-12 ENCOUNTER — APPOINTMENT (OUTPATIENT)
Dept: RHEUMATOLOGY | Facility: CLINIC | Age: 57
End: 2022-09-12

## 2022-09-12 VITALS
HEART RATE: 90 BPM | DIASTOLIC BLOOD PRESSURE: 84 MMHG | TEMPERATURE: 98.4 F | RESPIRATION RATE: 17 BRPM | SYSTOLIC BLOOD PRESSURE: 138 MMHG | OXYGEN SATURATION: 98 % | HEIGHT: 67 IN

## 2022-09-12 DIAGNOSIS — Z79.899 OTHER LONG TERM (CURRENT) DRUG THERAPY: ICD-10-CM

## 2022-09-12 DIAGNOSIS — M25.50 PAIN IN UNSPECIFIED JOINT: ICD-10-CM

## 2022-09-12 DIAGNOSIS — M79.10 MYALGIA, UNSPECIFIED SITE: ICD-10-CM

## 2022-09-12 DIAGNOSIS — R53.82 CHRONIC FATIGUE, UNSPECIFIED: ICD-10-CM

## 2022-09-12 DIAGNOSIS — M10.9 GOUT, UNSPECIFIED: ICD-10-CM

## 2022-09-12 LAB
25(OH)D3 SERPL-MCNC: 38.7 NG/ML
ALBUMIN SERPL ELPH-MCNC: 4.3 G/DL
ALDOLASE SERPL-CCNC: 6.7 U/L
ALP BLD-CCNC: 132 U/L
ALT SERPL-CCNC: 54 U/L
ANA SER IF-ACNC: NEGATIVE
ANION GAP SERPL CALC-SCNC: 12 MMOL/L
AST SERPL-CCNC: 26 U/L
BASOPHILS # BLD AUTO: 0.02 K/UL
BASOPHILS NFR BLD AUTO: 0.3 %
BILIRUB SERPL-MCNC: 1 MG/DL
BUN SERPL-MCNC: 31 MG/DL
C3 SERPL-MCNC: 166 MG/DL
C4 SERPL-MCNC: 35 MG/DL
CALCIUM SERPL-MCNC: 9.1 MG/DL
CCP AB SER IA-ACNC: <8 UNITS
CENTROMERE IGG SER-ACNC: <0.2 CD:130001892
CHLORIDE SERPL-SCNC: 104 MMOL/L
CHROMATIN AB SERPL-ACNC: <0.2 AL
CK SERPL-CCNC: 157 U/L
CO2 SERPL-SCNC: 26 MMOL/L
CREAT SERPL-MCNC: 1.13 MG/DL
CREAT SPEC-SCNC: 151 MG/DL
CREAT/PROT UR: 0.2 RATIO
CRP SERPL-MCNC: 19 MG/L
DSDNA AB SER-ACNC: <12 IU/ML
EGFR: 76 ML/MIN/1.73M2
ENA JO1 AB SER IA-ACNC: <0.2 AL
ENA RNP AB SER IA-ACNC: 0.3 AL
ENA RNP AB SER IA-ACNC: 0.3 AL
ENA SM AB SER IA-ACNC: <0.2 AL
ENA SS-A AB SER IA-ACNC: <0.2 AL
ENA SS-B AB SER IA-ACNC: <0.2 AL
EOSINOPHIL # BLD AUTO: 0.25 K/UL
EOSINOPHIL NFR BLD AUTO: 3.3 %
ERYTHROCYTE [SEDIMENTATION RATE] IN BLOOD BY WESTERGREN METHOD: 16 MM/HR
GLUCOSE SERPL-MCNC: 159 MG/DL
HCT VFR BLD CALC: 43.2 %
HGB BLD-MCNC: 14.9 G/DL
HISTONE AB SER QL: 0.3 UNITS
IMM GRANULOCYTES NFR BLD AUTO: 0.1 %
LYMPHOCYTES # BLD AUTO: 2.79 K/UL
LYMPHOCYTES NFR BLD AUTO: 36.6 %
MAN DIFF?: NORMAL
MCHC RBC-ENTMCNC: 29.3 PG
MCHC RBC-ENTMCNC: 34.5 GM/DL
MCV RBC AUTO: 85 FL
MONOCYTES # BLD AUTO: 0.77 K/UL
MONOCYTES NFR BLD AUTO: 10.1 %
NEUTROPHILS # BLD AUTO: 3.79 K/UL
NEUTROPHILS NFR BLD AUTO: 49.6 %
PLATELET # BLD AUTO: 235 K/UL
POTASSIUM SERPL-SCNC: 3.8 MMOL/L
PROT SERPL-MCNC: 6.8 G/DL
PROT UR-MCNC: 29 MG/DL
RBC # BLD: 5.08 M/UL
RBC # FLD: 13.6 %
RF+CCP IGG SER-IMP: NEGATIVE
RHEUMATOID FACT SER QL: 20 IU/ML
SODIUM SERPL-SCNC: 141 MMOL/L
THYROGLOB AB SERPL-ACNC: <20 IU/ML
THYROPEROXIDASE AB SERPL IA-ACNC: <10 IU/ML
TSH SERPL-ACNC: 3.33 UIU/ML
URATE SERPL-MCNC: 7.4 MG/DL
WBC # FLD AUTO: 7.63 K/UL

## 2022-09-12 PROCEDURE — 99215 OFFICE O/P EST HI 40 MIN: CPT

## 2022-09-12 RX ORDER — PREDNISONE 20 MG/1
20 TABLET ORAL DAILY
Qty: 30 | Refills: 1 | Status: ACTIVE | COMMUNITY
Start: 2022-02-24 | End: 1900-01-01

## 2022-09-13 NOTE — ASSESSMENT
[FreeTextEntry1] : Gout. Last UA: 7.4\par BL knees: moderate spurring at the quadriceps insertion. mild joint space narrowing and moderate spurring at the medial tibiofemoral compartment. \par Ankles: mild spurring at the distal fibula, tendinosis \par L spine: lower lumbar facet arthropathy.\par \par - increase allopurinol to 400mg daily. encouraged compliance with medication\par - c/w prednisone prn for acute flares\par - low purine diet encouraged\par - labs prior to f/u\par \par Discussed treatment plan with the patient. The patient was given the opportunity to ask questions and all questions were answered to their satisfaction.\par

## 2022-09-13 NOTE — HISTORY OF PRESENT ILLNESS
[FreeTextEntry1] : Pt presenting today for a f.u visit. Reports 1 flare of hand/wrist since LV from non compliance with diet and medications. \par Labs from 09/2022 reviewed with pt. UA: 7.4, mild elevation in ALT, ALK phos\par Imaging from 08/2022 reviewed with pt. \par ROS otherwise unchanged from LV\par denies fever, chills, oral ulcers, denies chest pain, chest palpitations, denies sob, denies nausea, vomiting, abdominal pain, blood in the urine, denies blood clots. No rashes.

## 2022-11-03 ENCOUNTER — LABORATORY RESULT (OUTPATIENT)
Age: 57
End: 2022-11-03

## 2022-11-21 ENCOUNTER — APPOINTMENT (OUTPATIENT)
Dept: RHEUMATOLOGY | Facility: CLINIC | Age: 57
End: 2022-11-21

## 2023-03-21 RX ORDER — ALLOPURINOL 100 MG/1
100 TABLET ORAL DAILY
Qty: 30 | Refills: 0 | Status: ACTIVE | COMMUNITY
Start: 2022-09-12 | End: 1900-01-01

## 2023-03-21 RX ORDER — ALLOPURINOL 300 MG/1
300 TABLET ORAL
Qty: 30 | Refills: 3 | Status: ACTIVE | COMMUNITY
Start: 2022-01-03 | End: 1900-01-01

## 2023-05-01 ENCOUNTER — APPOINTMENT (OUTPATIENT)
Dept: RHEUMATOLOGY | Facility: CLINIC | Age: 58
End: 2023-05-01

## 2023-05-24 ENCOUNTER — APPOINTMENT (OUTPATIENT)
Dept: ORTHOPEDIC SURGERY | Facility: CLINIC | Age: 58
End: 2023-05-24

## 2023-08-17 ENCOUNTER — APPOINTMENT (OUTPATIENT)
Dept: UROLOGY | Facility: CLINIC | Age: 58
End: 2023-08-17

## 2023-10-16 ENCOUNTER — APPOINTMENT (OUTPATIENT)
Dept: ORTHOPEDIC SURGERY | Facility: CLINIC | Age: 58
End: 2023-10-16
Payer: COMMERCIAL

## 2023-10-16 VITALS — BODY MASS INDEX: 36.1 KG/M2 | WEIGHT: 230 LBS | HEIGHT: 67 IN

## 2023-10-16 DIAGNOSIS — E66.9 OBESITY, UNSPECIFIED: ICD-10-CM

## 2023-10-16 DIAGNOSIS — M17.12 UNILATERAL PRIMARY OSTEOARTHRITIS, LEFT KNEE: ICD-10-CM

## 2023-10-16 DIAGNOSIS — Z98.890 OTHER SPECIFIED POSTPROCEDURAL STATES: ICD-10-CM

## 2023-10-16 PROCEDURE — J3490M: CUSTOM

## 2023-10-16 PROCEDURE — 99203 OFFICE O/P NEW LOW 30 MIN: CPT | Mod: 25

## 2023-10-16 PROCEDURE — 73564 X-RAY EXAM KNEE 4 OR MORE: CPT | Mod: 50

## 2023-10-16 PROCEDURE — 20611 DRAIN/INJ JOINT/BURSA W/US: CPT | Mod: 50

## 2023-10-30 ENCOUNTER — APPOINTMENT (OUTPATIENT)
Dept: ORTHOPEDIC SURGERY | Facility: CLINIC | Age: 58
End: 2023-10-30

## 2024-05-08 ENCOUNTER — APPOINTMENT (OUTPATIENT)
Dept: PODIATRY | Facility: CLINIC | Age: 59
End: 2024-05-08
Payer: COMMERCIAL

## 2024-05-08 DIAGNOSIS — Z86.79 PERSONAL HISTORY OF OTHER DISEASES OF THE CIRCULATORY SYSTEM: ICD-10-CM

## 2024-05-08 DIAGNOSIS — L60.3 NAIL DYSTROPHY: ICD-10-CM

## 2024-05-08 DIAGNOSIS — M79.672 PAIN IN RIGHT FOOT: ICD-10-CM

## 2024-05-08 DIAGNOSIS — E78.5 HYPERLIPIDEMIA, UNSPECIFIED: ICD-10-CM

## 2024-05-08 DIAGNOSIS — Z80.0 FAMILY HISTORY OF MALIGNANT NEOPLASM OF DIGESTIVE ORGANS: ICD-10-CM

## 2024-05-08 DIAGNOSIS — M79.671 PAIN IN RIGHT FOOT: ICD-10-CM

## 2024-05-08 DIAGNOSIS — Z80.3 FAMILY HISTORY OF MALIGNANT NEOPLASM OF BREAST: ICD-10-CM

## 2024-05-08 PROCEDURE — 99203 OFFICE O/P NEW LOW 30 MIN: CPT | Mod: 25

## 2024-05-08 PROCEDURE — 11755 BIOPSY NAIL UNIT: CPT | Mod: TA

## 2024-05-08 PROCEDURE — 73620 X-RAY EXAM OF FOOT: CPT | Mod: 50

## 2024-05-08 PROCEDURE — 29540 STRAPPING ANKLE &/FOOT: CPT | Mod: LT

## 2024-05-08 RX ORDER — DICLOFENAC SODIUM 100 MG/1
100 TABLET, FILM COATED, EXTENDED RELEASE ORAL DAILY
Qty: 30 | Refills: 0 | Status: ACTIVE | COMMUNITY
Start: 2024-05-08 | End: 1900-01-01

## 2024-05-08 RX ORDER — METFORMIN HYDROCHLORIDE 500 MG/1
500 TABLET, COATED ORAL
Refills: 0 | Status: ACTIVE | COMMUNITY

## 2024-05-08 RX ORDER — SEMAGLUTIDE 1.34 MG/ML
2 INJECTION, SOLUTION SUBCUTANEOUS
Refills: 0 | Status: ACTIVE | COMMUNITY

## 2024-05-08 NOTE — PHYSICAL EXAM
[General Appearance - Alert] : alert [General Appearance - In No Acute Distress] : in no acute distress [General Appearance - Well Nourished] : well nourished [de-identified] : right posterior heel with pain to palpation at Achilles tendon insertion, no pain along the Achilles tendon course extending into the ankle, with mild edema noted no palpable dell, active plantar flexion intact b/l with muscle strength 5/5 b/l. No pain on palpation of left foot medial plantar calcaneal tubricle.  Xrays, 2 views, AP and Lat, of b/l feet, with posterior and plantar heel osteophytes b/l with osteophytes noted to be larger to left foot, no acute fractures noted, 1st MPJ space noted to be minimal b/l, more so on left than right. [Sensation] : the sensory exam was normal to light touch and pinprick [Position Sense Dec.] : normal position sense at the level of the toes [Diminished Throughout Right Foot] : normal sensation with monofilament testing throughout right foot [Diminished Throughout Left Foot] : normal sensation with monofilament testing throughout left foot [FreeTextEntry1] : Achilles tendon reflex intact b/l

## 2024-05-08 NOTE — REVIEW OF SYSTEMS
[Limb Pain] : no limb pain [Limb Swelling] : no limb swelling [Negative] : Constitutional [de-identified] : thick, irregular toe nails

## 2024-05-08 NOTE — HISTORY OF PRESENT ILLNESS
[FreeTextEntry1] : This 58 year old male patient presents to office today for let heel pain. He states sometimes the area hurts, but it has been very painful and consistent since Sunday. He denies injuring the area. He states the pain is in the back of his heel and hurts the most when he is walking. Patient states when the pain is at it's worst he gets a radiating, sharp pain down into his heel. He states the pain is different from when he had plantar fasciitis to that foot in the 1990s, which he had surgery in the 1990s for and has not come back since. Patient states he has noticed that some of his toe nails are yellow and thick and he has noticed they curve and push into his skin.

## 2024-05-15 ENCOUNTER — APPOINTMENT (OUTPATIENT)
Dept: PODIATRY | Facility: CLINIC | Age: 59
End: 2024-05-15
Payer: COMMERCIAL

## 2024-05-15 DIAGNOSIS — R60.0 LOCALIZED EDEMA: ICD-10-CM

## 2024-05-15 DIAGNOSIS — M76.62 ACHILLES TENDINITIS, LEFT LEG: ICD-10-CM

## 2024-05-15 PROCEDURE — 29540 STRAPPING ANKLE &/FOOT: CPT | Mod: 59,LT

## 2024-05-15 PROCEDURE — 99213 OFFICE O/P EST LOW 20 MIN: CPT | Mod: 25

## 2024-05-15 NOTE — PHYSICAL EXAM
[General Appearance - Alert] : alert [General Appearance - In No Acute Distress] : in no acute distress [General Appearance - Well Nourished] : well nourished [Sensation] : the sensory exam was normal to light touch and pinprick [de-identified] : left posterior heel with pain to palpation at Achilles tendon insertion, no pain along the Achilles tendon course extending into the ankle, mild edema noted ,no palpable dell, active plantar flexion intact b/l with muscle strength 5/5 b/l. No pain on palpation of left foot medial plantar calcaneal tubercle. [Position Sense Dec.] : normal position sense at the level of the toes [Diminished Throughout Right Foot] : normal sensation with monofilament testing throughout right foot [Diminished Throughout Left Foot] : normal sensation with monofilament testing throughout left foot [FreeTextEntry1] : Achilles tendon reflex intact b/l

## 2024-05-15 NOTE — ASSESSMENT
[FreeTextEntry1] : Exam. Instructed the patient on use of diclofenac. Applied Elastoplast strapping with LA pad to the patient's left lower extremity. Instructed patient to wear supportive shoe gear and not to walk barefoot. Fabricated and dispensed 2 felt offloading heel pads. Pending nail biopsy results, will review results with patient when available. Rx for diagnostic ultrasound of left Achilles tendon. Instructed patient to wear supportive sneakers and to rest, ice and elevate the area prn. Instructed patient to call our office if any problems arise. Patient demonstrated verbal understanding of all instructions.

## 2024-05-15 NOTE — REVIEW OF SYSTEMS
[Negative] : Constitutional [Limb Pain] : no limb pain [Limb Swelling] : no limb swelling [de-identified] : thick, irregular toe nails

## 2024-05-15 NOTE — HISTORY OF PRESENT ILLNESS
[FreeTextEntry1] : 58 year old male patient returns today for continued care of left heel pain. Patient states he has been taking diclofenac as prescribed and he feels that and the strapping has been helpful. He states the area is less swollen and his pain today is 2/10. Patient states the heel lifts have also been helpful. He states when he gets up in the morning an area of his Achilles feels "tight" then it starts to feel less tight as he walks. He states his thick toe nails bother him at times.

## 2024-05-29 ENCOUNTER — APPOINTMENT (OUTPATIENT)
Dept: PODIATRY | Facility: CLINIC | Age: 59
End: 2024-05-29

## 2024-06-19 ENCOUNTER — EMERGENCY (EMERGENCY)
Facility: HOSPITAL | Age: 59
LOS: 1 days | End: 2024-06-19
Attending: STUDENT IN AN ORGANIZED HEALTH CARE EDUCATION/TRAINING PROGRAM
Payer: SELF-PAY

## 2024-06-19 VITALS
DIASTOLIC BLOOD PRESSURE: 89 MMHG | SYSTOLIC BLOOD PRESSURE: 136 MMHG | RESPIRATION RATE: 18 BRPM | OXYGEN SATURATION: 99 % | TEMPERATURE: 99 F | HEART RATE: 99 BPM | WEIGHT: 214.95 LBS

## 2024-06-19 PROCEDURE — 73564 X-RAY EXAM KNEE 4 OR MORE: CPT | Mod: 26,RT

## 2024-06-19 PROCEDURE — 99284 EMERGENCY DEPT VISIT MOD MDM: CPT

## 2024-06-19 PROCEDURE — 73564 X-RAY EXAM KNEE 4 OR MORE: CPT

## 2024-06-19 PROCEDURE — 99283 EMERGENCY DEPT VISIT LOW MDM: CPT

## 2024-06-19 NOTE — ED PROVIDER NOTE - NSFOLLOWUPINSTRUCTIONS_ED_ALL_ED_FT
Patient education: Knee pain (The Basics)  Written by the doctors and editors at [facility]  Please read the Disclaimer at the end of this page.    What causes knee pain?  Many different conditions can cause knee pain. Some of the most common are listed below.    ?Bending or using the knee too much – This can cause pain in the front of the knee that worsens with running, climbing steps, or sitting for a long time.    ?Arthritis – Arthritis is a general term that means inflammation of the joints. There are lots of types of arthritis. The most common type, called osteoarthritis, often comes with age. It can cause pain, stiffness, and swelling (figure 1).    ?Bursitis – Bursitis happens when fluid-filled sacs around the knee (called "bursae") get irritated or swollen (figure 2). Bursitis can cause pain and swelling.    ?A collection of fluid in the knee – This can happen after a knee injury.    ?A tear in the meniscus – The meniscus is a cushion of rubbery material (cartilage) between the thigh bone and the leg bone (figure 3).    ?A tear in a ligament – Ligaments are bands of tissue that connect 1 bone to another. There are 4 ligaments in each knee (figure 3).    ?Muscle strain – Different leg muscles move the knee joint, causing the knee to bend and straighten. If 1 of these muscles or its tendon doesn't work well, moving the knee can cause pain. (Tendons are bands of tissue the connect muscles to bones.)    ?Other knee injuries, a knee joint infection, or a condition called gout, which causes crystals to form inside joints    ?Conditions that don't involve the knee – For example, problems in the hip can sometimes cause knee pain.    Is there anything I can do on my own to feel better?  Yes. To ease your symptoms, you can:    ?Put ice on the knee to reduce pain and swelling – For the first few weeks after an injury, or after an activity that makes your pain worse, you can try icing your knee. Put a cold gel pack, bag of ice, or bag of frozen vegetables on the injured area every 1 to 2 hours, for 15 minutes each time. Put a thin towel between the ice (or other cold object) and your skin. To reduce swelling, sit or lie down and raise your leg above the level of your heart when you put ice on it.    ?Rest your knee and avoid movements that worsen the pain – Try not to squat, kneel, or run. Also, don't use exercise machines, such as stair steppers or rowing machines. Instead, you can walk or swim (the front and back crawl strokes) for exercise.    ?Take a pain-relieving medicine, such as acetaminophen (sample brand name: Tylenol) or ibuprofen (sample brand names: Advil, Motrin).    Should I see a doctor or nurse?  See your doctor or nurse if:    ?You are unable to put weight on your knee, your knee "locks" in place, or your knee "gives out"    ?Your knee is very swollen and painful    ?You have a fever with knee pain, swelling, and redness    ?Your knee pain doesn't get better or gets worse after you treat it on your own for a few days    How is knee pain treated?  The right treatment for knee pain depends on what is causing it. Treatments might include:    ?Wearing a knee brace or shoe insert    ?Doing exercises to strengthen and stretch the muscles that move the knee joint – Ask your doctor or nurse which exercises can help with the cause of your pain.    ?Having physical therapy    ?Losing weight, if needed – Talk to your doctor or nurse about whether losing weight might help your knee pain. They can help you lose weight in a healthy way.    ?Getting a shot of medicine in the knee    ?Other medicines    ?Surgery

## 2024-06-19 NOTE — ED PROVIDER NOTE - CARE PROVIDER_API CALL
Yunior Aranda  Orthopaedic Surgery  [address] [place] [id number]  Phone: [phone]  Fax: [phone]  Follow Up Time:

## 2024-06-19 NOTE — ED PROVIDER NOTE - LOWER EXTREMITY EXAM, RIGHT
No gross deformity, mild swelling, STEFFI, strength intact, diffuse swelling, medial ttp, compartments soft, distal pulses intact.

## 2024-06-19 NOTE — ED PROVIDER NOTE - PATIENT PORTAL LINK FT
You can access the FollowMyHealth Patient Portal offered by [facility] by registering at the following website: [email or web address]. By joining [facility]’s FollowMyHealth portal, you will also be able to view your health information using other applications (apps) compatible with our system.

## 2024-06-19 NOTE — ED PROVIDER NOTE - ADDITIONAL NOTES AND INSTRUCTIONS:
PT was evaluated At [facility] ED and was found to have a condition that warranted time of to rest and heal from WORK/SCHOOL.   Taran Cortez PA-C

## 2024-06-19 NOTE — ED PROVIDER NOTE - CLINICAL SUMMARY MEDICAL DECISION MAKING FREE TEXT BOX
Patient with no significant past medical history presents emergency room for evaluation of right knee pain x 2 weeks.  Patient states that he was walking on the stairs with a truck when he hyperextended his right knee and had a sudden onset of pain.  Patient states that pain has been persistent since then progressively worse dull in nature nonradiating made worse with activity improved by nothing.  Patient denies numbness tingling loss sensation headache dizziness fall around back pain. On exam Rt knee, No gross deformity, mild swelling, STEFFI, strength intact, diffuse swelling, medial ttp, compartments soft, distal pulses intact. Pt with no acute findigns, pt neuro vasc intact, pt cleared for dc home with supportive care, WBAT, follow up to ortho, ace for [place], Pt educated about when to return to the ED if needed. PT verbalizes that he understands all instructions and results. Pt informed that ED is open and available 24/7 365 days a yr, encouraged to return to the ED if they have any change in condition, or feel the need for revaluation.

## 2024-06-20 PROBLEM — E11.9 TYPE 2 DIABETES MELLITUS WITHOUT COMPLICATIONS: Chronic | Status: ACTIVE | Noted: 2024-06-19

## 2024-06-21 ENCOUNTER — APPOINTMENT (OUTPATIENT)
Dept: ORTHOPEDIC SURGERY | Facility: CLINIC | Age: 59
End: 2024-06-21
Payer: OTHER MISCELLANEOUS

## 2024-06-21 VITALS
WEIGHT: 215 LBS | HEIGHT: 67 IN | DIASTOLIC BLOOD PRESSURE: 73 MMHG | HEART RATE: 105 BPM | SYSTOLIC BLOOD PRESSURE: 105 MMHG | BODY MASS INDEX: 33.74 KG/M2 | TEMPERATURE: 98.1 F

## 2024-06-21 DIAGNOSIS — M25.561 PAIN IN RIGHT KNEE: ICD-10-CM

## 2024-06-21 DIAGNOSIS — M23.91 UNSPECIFIED INTERNAL DERANGEMENT OF RIGHT KNEE: ICD-10-CM

## 2024-06-21 DIAGNOSIS — M17.11 UNILATERAL PRIMARY OSTEOARTHRITIS, RIGHT KNEE: ICD-10-CM

## 2024-06-21 PROCEDURE — 20610 DRAIN/INJ JOINT/BURSA W/O US: CPT | Mod: RT

## 2024-06-21 PROCEDURE — 99204 OFFICE O/P NEW MOD 45 MIN: CPT | Mod: 25

## 2024-06-21 RX ORDER — DICLOFENAC SODIUM 75 MG/1
75 TABLET, DELAYED RELEASE ORAL
Qty: 60 | Refills: 1 | Status: ACTIVE | COMMUNITY
Start: 2024-06-21 | End: 1900-01-01

## 2024-06-21 NOTE — PROCEDURE
[FreeTextEntry1] : After verbal consent was obtained , the right knee was prepped with alcohol along the suprapatellar lateral aspect of the knee and allowed to dry for greater than 30 seconds then placed a 18-gauge in the lateral aspect of the suprapatellar pouch and aspirated approximately 55 cc of straw-colored fluid.  I subsequently placed 2 cc of lidocaine 1%, 30 mg of Toradol and 1 mL of 8 mg/mL of Decadron into the right knee, covered with a bandage and wrapped with an Ace wrap.  The patient tolerated well without any complications.

## 2024-06-21 NOTE — ASSESSMENT
[FreeTextEntry1] : 58-year-old male with right knee osteoarthritis internal derangement status post work injury  I believe that Neftaly is likely having exacerbation of his right knee osteoarthritis however I was unable to obtain a full examination given the severity of his symptoms today and I would like to see him back in 2 weeks for a better exam and reevaluation we will see how he responds to our injection aspiration and anti-inflammatory medications if he fails to respond we will consider more advanced imaging for possible meniscal injury.

## 2024-06-21 NOTE — REASON FOR VISIT
[Initial Visit] : an initial visit for [Workers' Comp: Date of Injury: _______] : This visit is related to worker's compensation. Date of Injury: [unfilled] [Other: ____] : [unfilled] [Spouse] : spouse

## 2024-06-21 NOTE — HISTORY OF PRESENT ILLNESS
[de-identified] : Chief Complaint: Right knee pain   History of Present Illness: 58-year-old male presenting today for complaints of right knee status post a work injury on 5/31/2024.  He states that he was walking down stairs with a heavy object felt his knee buckle and give way he was having a consider amount of knee pain that day and this is only worsened over the past 3 weeks.  He was seen in the emergency room on 6/19/2024 because the symptoms have become so severe he was having decreased ability to ambulate decreased ability to stand he has started to use a cane because of his pain.  This was not responding to over-the-counter anti-inflammatories.  The emergency room he was found to have a knee effusion was given some anti-inflammatories he is here today for follow-up and management.  He has been unable to work over the past 2 weeks states the pain is a 9 out of 10.  Mainly isolated to the medial lateral compartment around the knee joint   Past medical history, past surgical history, medications, allergies, social history, and family history are as documented in our records today.  Notable items include: None   Review of Systems: I have reviewed the patient's documented Review of Systems data today, I concur with this documentation.  [FreeTextEntry1] : Chief Complaint: Right knee pain   History of Present Illness: 58-year-old male presenting today for complaints of right knee status post a work injury on 5/31/2024.  He states that he was walking down stairs with a heavy object felt his knee buckle and give way he was having a consider amount of knee pain that day and this is only worsened over the past 3 weeks.  He was seen in the emergency room on 6/19/2024 because the symptoms have become so severe he was having decreased ability to ambulate decreased ability to stand he has started to use a cane because of his pain.  This was not responding to over-the-counter anti-inflammatories.  The emergency room he was found to have a knee effusion was given some anti-inflammatories he is here today for follow-up and management.  He has been unable to work over the past 2 weeks states the pain is a 9 out of 10.  Mainly isolated to the medial lateral compartment around the knee joint   Past medical history, past surgical history, medications, allergies, social history, and family history are as documented in our records today.  Notable items include: None   Review of Systems: I have reviewed the patient's documented Review of Systems data today, I concur with this documentation.  [FreeTextEntry3] : Difficulty with any bending twisting lifting standing walking [FreeTextEntry4] : None [FreeTextEntry5] : None [Has the patient missed work because of the injury/illness?] : The patient has missed work because of the injury/illness. [No] : The patient is currently not working. [FreeTextEntry6] : 6/10/24

## 2024-06-21 NOTE — DISCUSSION/SUMMARY
[de-identified] : Left knee aspiration and injection performed to the office removed about 55 cc of straw-colored fluid Patient improvement in range of motion pain after aspiration injection no signs of infection at all Limited weightbearing and RICE right lower extremity Diclofenac 75 mg twice daily for 2 weeks I will see him back in 2 weeks if he continues to have symptoms we will consider a right knee MRI A work note will be provided for 2 weeks off work I will see him back in 2 weeks for reassessment

## 2024-06-21 NOTE — PHYSICAL EXAM
[de-identified] : CONSTITUTIONAL: Patient is a very pleasant individual who is well-nourished and appears stated age. PSYCHIATRIC: Alert and oriented times three and in no apparent distress, and participates with orthopedic evaluation well. HEAD: Atraumatic and nonsyndromic in appearance. EENT: No thyromegaly, EOMI. RESPIRATORY: Respiratory rate is regular, not dyspneic on examination. LYMPHATICS: There is no cervical or axillary lymphadenopathy. INTEGUMENTARY: Skin is clean, dry, and intact to bilateral lower extremities. VASCULAR: There is brisk capillary refill about the bilateral Lower Extremities with 2+ DP Pulse  Right knee exam There is a large suprapatellar effusion no signs of ecchymosis bruising or deformity to right knee There is positive ballottement test There is a palpable quadriceps and patellar tendon No significant pain with patellar compression There is pain along medial lateral joint line Extension is limited to -10 degrees from neutral and flexion to 70 degrees until discomfort is felt Pain with varus and valgus stress no appreciable instability [de-identified] : 4 views right knee performed in [facility] PACS 6/19/2024 demonstrates patellofemoral arthritis as well as medial lateral joint space narrowing, mild there is a large suprapatellar effusion no signs of fractures or dislocations.

## 2024-07-08 ENCOUNTER — APPOINTMENT (OUTPATIENT)
Dept: ORTHOPEDIC SURGERY | Facility: CLINIC | Age: 59
End: 2024-07-08

## 2024-07-08 ENCOUNTER — APPOINTMENT (OUTPATIENT)
Dept: ORTHOPEDIC SURGERY | Facility: CLINIC | Age: 59
End: 2024-07-08
Payer: OTHER MISCELLANEOUS

## 2024-07-08 VITALS
WEIGHT: 215 LBS | SYSTOLIC BLOOD PRESSURE: 110 MMHG | DIASTOLIC BLOOD PRESSURE: 75 MMHG | BODY MASS INDEX: 33.74 KG/M2 | HEIGHT: 67 IN

## 2024-07-08 DIAGNOSIS — M23.91 UNSPECIFIED INTERNAL DERANGEMENT OF RIGHT KNEE: ICD-10-CM

## 2024-07-08 DIAGNOSIS — M17.11 UNILATERAL PRIMARY OSTEOARTHRITIS, RIGHT KNEE: ICD-10-CM

## 2024-07-08 PROCEDURE — 99213 OFFICE O/P EST LOW 20 MIN: CPT

## 2024-10-29 ENCOUNTER — RX RENEWAL (OUTPATIENT)
Age: 59
End: 2024-10-29